# Patient Record
Sex: FEMALE | Race: WHITE | NOT HISPANIC OR LATINO | Employment: OTHER | URBAN - METROPOLITAN AREA
[De-identification: names, ages, dates, MRNs, and addresses within clinical notes are randomized per-mention and may not be internally consistent; named-entity substitution may affect disease eponyms.]

---

## 2019-11-01 ENCOUNTER — CORNEA CONSULT (OUTPATIENT)
Dept: URBAN - METROPOLITAN AREA CLINIC 39 | Facility: CLINIC | Age: 54
End: 2019-11-01

## 2019-11-01 DIAGNOSIS — H02.886: ICD-10-CM

## 2019-11-01 DIAGNOSIS — H43.813: ICD-10-CM

## 2019-11-01 DIAGNOSIS — Z98.890: ICD-10-CM

## 2019-11-01 DIAGNOSIS — H04.123: ICD-10-CM

## 2019-11-01 DIAGNOSIS — Z96.1: ICD-10-CM

## 2019-11-01 DIAGNOSIS — H21.233: ICD-10-CM

## 2019-11-01 DIAGNOSIS — H02.883: ICD-10-CM

## 2019-11-01 PROCEDURE — 92286 ANT SGM IMG I&R SPECLR MIC: CPT

## 2019-11-01 PROCEDURE — 92012 INTRM OPH EXAM EST PATIENT: CPT

## 2019-11-01 PROCEDURE — 92025 CPTRIZED CORNEAL TOPOGRAPHY: CPT

## 2019-11-01 PROCEDURE — 92134 CPTRZ OPH DX IMG PST SGM RTA: CPT

## 2019-11-01 ASSESSMENT — TONOMETRY
OD_IOP_MMHG: 17
OS_IOP_MMHG: 16

## 2019-11-01 ASSESSMENT — VISUAL ACUITY
OS_SC: 20/20
OD_SC: 20/50
OD_SC: J1
OS_SC: J4

## 2020-03-30 NOTE — PATIENT DISCUSSION
Continue: ciprofloxacin (ciprofloxacin): drops: 0.3% 1 drop four times a day as directed into left eye 03-

## 2020-03-31 NOTE — PATIENT DISCUSSION
- From child's fingernail earlier yesterday, healing well.  Fully re-epithelialized, though irregular